# Patient Record
Sex: FEMALE | Race: WHITE | NOT HISPANIC OR LATINO | Employment: OTHER | URBAN - METROPOLITAN AREA
[De-identification: names, ages, dates, MRNs, and addresses within clinical notes are randomized per-mention and may not be internally consistent; named-entity substitution may affect disease eponyms.]

---

## 2024-10-31 ENCOUNTER — APPOINTMENT (OUTPATIENT)
Dept: PRIMARY CARE | Facility: CLINIC | Age: 64
End: 2024-10-31
Payer: MEDICARE

## 2024-10-31 VITALS
WEIGHT: 166 LBS | OXYGEN SATURATION: 96 % | SYSTOLIC BLOOD PRESSURE: 128 MMHG | DIASTOLIC BLOOD PRESSURE: 86 MMHG | HEART RATE: 89 BPM | HEIGHT: 64 IN | BODY MASS INDEX: 28.34 KG/M2 | RESPIRATION RATE: 16 BRPM

## 2024-10-31 DIAGNOSIS — R01.1 HEART MURMUR: Primary | ICD-10-CM

## 2024-10-31 DIAGNOSIS — I10 PRIMARY HYPERTENSION: ICD-10-CM

## 2024-10-31 PROCEDURE — 99204 OFFICE O/P NEW MOD 45 MIN: CPT | Performed by: INTERNAL MEDICINE

## 2024-10-31 PROCEDURE — 1126F AMNT PAIN NOTED NONE PRSNT: CPT | Performed by: INTERNAL MEDICINE

## 2024-10-31 PROCEDURE — G2211 COMPLEX E/M VISIT ADD ON: HCPCS | Performed by: INTERNAL MEDICINE

## 2024-10-31 PROCEDURE — 3008F BODY MASS INDEX DOCD: CPT | Performed by: INTERNAL MEDICINE

## 2024-10-31 PROCEDURE — 3079F DIAST BP 80-89 MM HG: CPT | Performed by: INTERNAL MEDICINE

## 2024-10-31 PROCEDURE — 1159F MED LIST DOCD IN RCRD: CPT | Performed by: INTERNAL MEDICINE

## 2024-10-31 PROCEDURE — 3074F SYST BP LT 130 MM HG: CPT | Performed by: INTERNAL MEDICINE

## 2024-10-31 RX ORDER — LOSARTAN POTASSIUM 50 MG/1
50 TABLET ORAL DAILY
COMMUNITY
Start: 2024-10-08 | End: 2024-10-31 | Stop reason: SDUPTHER

## 2024-10-31 RX ORDER — LOSARTAN POTASSIUM 50 MG/1
50 TABLET ORAL DAILY
Qty: 90 TABLET | Refills: 3 | Status: SHIPPED | OUTPATIENT
Start: 2024-10-31

## 2024-10-31 ASSESSMENT — PAIN SCALES - GENERAL: PAINLEVEL_OUTOF10: 0-NO PAIN

## 2024-11-09 PROBLEM — I10 PRIMARY HYPERTENSION: Status: ACTIVE | Noted: 2024-11-09

## 2024-11-09 PROBLEM — R01.1 HEART MURMUR: Status: ACTIVE | Noted: 2024-11-09

## 2024-11-09 NOTE — PROGRESS NOTES
"Subjective   Obdulia Bond is a 64 y.o. female who presents for New Patient Visit.  Patient presents to [facility].  She has a history of hypertension.  She is on the losartan.  She is compliant with her medication.  No headaches or chest pain.  Patient noted to have a murmur on exam.  She has not had any recent echocardiogram.  She denies any chest pain, lightheadedness, dizziness, syncope.        Objective     /86 (BP Location: Right arm, Patient Position: Sitting, BP Cuff Size: Adult)   Pulse 89   Resp 16   Ht 1.626 m (5' 4\")   Wt 75.3 kg (166 lb)   SpO2 96%   BMI 28.49 kg/m²      Physical Exam  Constitutional:       Appearance: Normal appearance.   HENT:      Head: Normocephalic and atraumatic.      Nose: Nose normal.      Mouth/Throat:      Mouth: Mucous membranes are moist.      Pharynx: Oropharynx is clear.   Eyes:      Extraocular Movements: Extraocular movements intact.      Pupils: Pupils are equal, round, and reactive to light.   Cardiovascular:      Rate and Rhythm: Normal rate and regular rhythm.      Heart sounds: Murmur heard.   Pulmonary:      Effort: No respiratory distress.      Breath sounds: Normal breath sounds. No wheezing, rhonchi or rales.   Abdominal:      General: Bowel sounds are normal. There is no distension.      Palpations: Abdomen is soft.      Tenderness: There is no abdominal tenderness. There is no guarding.   Musculoskeletal:      Right lower leg: No edema.      Left lower leg: No edema.   Skin:     General: Skin is warm and dry.   Neurological:      Mental Status: She is alert and oriented to person, place, and time. Mental status is at baseline.   Psychiatric:         Mood and Affect: Mood normal.         Behavior: Behavior normal.         Assessment/Plan   Problem List Items Addressed This Visit       Primary hypertension     Blood pressure controlled  Continue losartan         Heart murmur - Primary    Relevant Medications    losartan (Cozaar) 50 mg tablet    " Other Relevant Orders    Transthoracic Echo (TTE) Complete     Patient to get echocardiogram  Follow-up in December       Luciano Sargent DO

## 2024-11-21 ENCOUNTER — HOSPITAL ENCOUNTER (OUTPATIENT)
Dept: CARDIOLOGY | Facility: CLINIC | Age: 64
Discharge: HOME | End: 2024-11-21
Payer: MEDICARE

## 2024-11-21 DIAGNOSIS — R01.1 HEART MURMUR: Primary | ICD-10-CM

## 2024-11-21 DIAGNOSIS — I10 PRIMARY HYPERTENSION: ICD-10-CM

## 2024-11-21 DIAGNOSIS — R01.1 HEART MURMUR: ICD-10-CM

## 2024-11-21 LAB
AORTIC VALVE MEAN GRADIENT: 30 MMHG
AORTIC VALVE PEAK VELOCITY: 4.16 M/S
AV PEAK GRADIENT: 69 MMHG
AVA (PEAK VEL): 0.68 CM2
AVA (VTI): 0.84 CM2
EJECTION FRACTION APICAL 4 CHAMBER: 73.3
EJECTION FRACTION: 71 %
LEFT ATRIUM VOLUME AREA LENGTH INDEX BSA: 38.8 ML/M2
LEFT VENTRICLE INTERNAL DIMENSION DIASTOLE: 3.42 CM (ref 3.5–6)
LEFT VENTRICULAR OUTFLOW TRACT DIAMETER: 1.91 CM
MITRAL VALVE E/A RATIO: 0.72
RIGHT VENTRICLE FREE WALL PEAK S': 10 CM/S
TRICUSPID ANNULAR PLANE SYSTOLIC EXCURSION: 2.4 CM

## 2024-11-21 PROCEDURE — 93306 TTE W/DOPPLER COMPLETE: CPT | Performed by: INTERNAL MEDICINE

## 2024-11-21 PROCEDURE — C8929 TTE W OR WO FOL WCON,DOPPLER: HCPCS

## 2024-11-21 PROCEDURE — 2500000004 HC RX 250 GENERAL PHARMACY W/ HCPCS (ALT 636 FOR OP/ED): Performed by: INTERNAL MEDICINE

## 2024-12-05 ENCOUNTER — APPOINTMENT (OUTPATIENT)
Dept: PRIMARY CARE | Facility: CLINIC | Age: 64
End: 2024-12-05
Payer: MEDICARE

## 2024-12-05 VITALS
RESPIRATION RATE: 12 BRPM | SYSTOLIC BLOOD PRESSURE: 138 MMHG | OXYGEN SATURATION: 98 % | DIASTOLIC BLOOD PRESSURE: 80 MMHG | WEIGHT: 165 LBS | HEART RATE: 90 BPM | BODY MASS INDEX: 28.32 KG/M2

## 2024-12-05 DIAGNOSIS — I35.0 SEVERE AORTIC STENOSIS: ICD-10-CM

## 2024-12-05 DIAGNOSIS — E78.00 PURE HYPERCHOLESTEROLEMIA: Primary | ICD-10-CM

## 2024-12-05 DIAGNOSIS — I10 PRIMARY HYPERTENSION: ICD-10-CM

## 2024-12-05 PROBLEM — R06.02 SHORTNESS OF BREATH AT REST: Status: ACTIVE | Noted: 2024-12-05

## 2024-12-05 PROBLEM — J18.9 RLL PNEUMONIA: Status: ACTIVE | Noted: 2024-12-05

## 2024-12-05 PROBLEM — R42 LIGHTHEADEDNESS: Status: ACTIVE | Noted: 2024-12-05

## 2024-12-05 PROCEDURE — 99214 OFFICE O/P EST MOD 30 MIN: CPT | Performed by: INTERNAL MEDICINE

## 2024-12-05 PROCEDURE — G2211 COMPLEX E/M VISIT ADD ON: HCPCS | Performed by: INTERNAL MEDICINE

## 2024-12-05 PROCEDURE — 1126F AMNT PAIN NOTED NONE PRSNT: CPT | Performed by: INTERNAL MEDICINE

## 2024-12-05 PROCEDURE — 3075F SYST BP GE 130 - 139MM HG: CPT | Performed by: INTERNAL MEDICINE

## 2024-12-05 PROCEDURE — 1159F MED LIST DOCD IN RCRD: CPT | Performed by: INTERNAL MEDICINE

## 2024-12-05 PROCEDURE — 3079F DIAST BP 80-89 MM HG: CPT | Performed by: INTERNAL MEDICINE

## 2024-12-05 RX ORDER — ATORVASTATIN CALCIUM 40 MG/1
40 TABLET, FILM COATED ORAL DAILY
Qty: 90 TABLET | Refills: 3 | Status: SHIPPED | OUTPATIENT
Start: 2024-12-05 | End: 2025-11-30

## 2024-12-05 ASSESSMENT — ENCOUNTER SYMPTOMS
OCCASIONAL FEELINGS OF UNSTEADINESS: 0
LOSS OF SENSATION IN FEET: 0
DEPRESSION: 0

## 2024-12-05 ASSESSMENT — PAIN SCALES - GENERAL: PAINLEVEL_OUTOF10: 0-NO PAIN

## 2024-12-15 PROBLEM — I10 HIGH BLOOD PRESSURE: Status: RESOLVED | Noted: 2022-09-01 | Resolved: 2024-12-15

## 2024-12-15 PROBLEM — E78.00 PURE HYPERCHOLESTEROLEMIA: Status: ACTIVE | Noted: 2024-12-15

## 2024-12-15 PROBLEM — J18.9 RLL PNEUMONIA: Status: RESOLVED | Noted: 2024-12-05 | Resolved: 2024-12-15

## 2024-12-15 PROBLEM — I35.0 SEVERE AORTIC STENOSIS: Status: ACTIVE | Noted: 2024-12-15

## 2024-12-15 PROBLEM — R06.02 SHORTNESS OF BREATH AT REST: Status: RESOLVED | Noted: 2024-12-05 | Resolved: 2024-12-15

## 2024-12-16 NOTE — PROGRESS NOTES
Subjective   Obdulia Bond is a 64 y.o. female who presents for Follow-up (Pt is being seen for follow up visit post cardiac echo).  Patient presents for follow-up of testing.  She had an echocardiogram performed.  The results were discussed with patient at length.  She denies any cardiac symptoms.  No chest pain or shortness of breath.  Lab work reviewed with patient.  Her ASCVD is 16.5%.  She is agreeable to take a statin.  Adverse effects discussed.        Objective     /80   Pulse 90   Resp 12   Wt 74.8 kg (165 lb)   SpO2 98%   BMI 28.32 kg/m²      Physical Exam  Constitutional:       Appearance: Normal appearance.   HENT:      Head: Normocephalic and atraumatic.      Nose: Nose normal.      Mouth/Throat:      Mouth: Mucous membranes are moist.      Pharynx: Oropharynx is clear.   Eyes:      Extraocular Movements: Extraocular movements intact.      Pupils: Pupils are equal, round, and reactive to light.   Cardiovascular:      Rate and Rhythm: Normal rate and regular rhythm.      Heart sounds: Murmur heard.   Pulmonary:      Effort: No respiratory distress.      Breath sounds: Normal breath sounds. No wheezing, rhonchi or rales.   Abdominal:      General: Bowel sounds are normal. There is no distension.      Palpations: Abdomen is soft.      Tenderness: There is no abdominal tenderness. There is no guarding.   Musculoskeletal:      Right lower leg: No edema.      Left lower leg: No edema.   Skin:     General: Skin is warm and dry.   Neurological:      Mental Status: She is alert and oriented to person, place, and time. Mental status is at baseline.   Psychiatric:         Mood and Affect: Mood normal.         Behavior: Behavior normal.         Assessment/Plan   Problem List Items Addressed This Visit       Primary hypertension     Continue current medications         Pure hypercholesterolemia - Primary    Relevant Medications    atorvastatin (Lipitor) 40 mg tablet    Other Relevant Orders     Comprehensive Metabolic Panel    Severe aortic stenosis     Continue to monitor          Start statin  Follow-up for blood pressure check and repeat CMP       Luciano Sargent, DO

## 2025-05-23 LAB
ALBUMIN SERPL-MCNC: 4.5 G/DL (ref 3.6–5.1)
ALP SERPL-CCNC: 66 U/L (ref 37–153)
ALT SERPL-CCNC: 15 U/L (ref 6–29)
ANION GAP SERPL CALCULATED.4IONS-SCNC: 8 MMOL/L (CALC) (ref 7–17)
AST SERPL-CCNC: 17 U/L (ref 10–35)
BILIRUB SERPL-MCNC: 0.9 MG/DL (ref 0.2–1.2)
BUN SERPL-MCNC: 11 MG/DL (ref 7–25)
CALCIUM SERPL-MCNC: 9.8 MG/DL (ref 8.6–10.4)
CHLORIDE SERPL-SCNC: 101 MMOL/L (ref 98–110)
CO2 SERPL-SCNC: 30 MMOL/L (ref 20–32)
CREAT SERPL-MCNC: 0.6 MG/DL (ref 0.5–1.05)
EGFRCR SERPLBLD CKD-EPI 2021: 99 ML/MIN/1.73M2
GLUCOSE SERPL-MCNC: 112 MG/DL (ref 65–99)
POTASSIUM SERPL-SCNC: 4.1 MMOL/L (ref 3.5–5.3)
PROT SERPL-MCNC: 7.2 G/DL (ref 6.1–8.1)
SODIUM SERPL-SCNC: 139 MMOL/L (ref 135–146)

## 2025-06-19 ENCOUNTER — HOSPITAL ENCOUNTER (OUTPATIENT)
Dept: RADIOLOGY | Facility: CLINIC | Age: 65
Discharge: HOME | End: 2025-06-19
Payer: MEDICARE

## 2025-06-19 VITALS — WEIGHT: 165 LBS | BODY MASS INDEX: 28.32 KG/M2

## 2025-06-19 DIAGNOSIS — Z12.31 SCREENING MAMMOGRAM FOR BREAST CANCER: ICD-10-CM

## 2025-06-19 PROCEDURE — 77067 SCR MAMMO BI INCL CAD: CPT | Performed by: RADIOLOGY

## 2025-06-19 PROCEDURE — 77063 BREAST TOMOSYNTHESIS BI: CPT | Performed by: RADIOLOGY

## 2025-06-19 PROCEDURE — 77063 BREAST TOMOSYNTHESIS BI: CPT
